# Patient Record
Sex: MALE | Race: WHITE | Employment: OTHER
[De-identification: names, ages, dates, MRNs, and addresses within clinical notes are randomized per-mention and may not be internally consistent; named-entity substitution may affect disease eponyms.]

---

## 2023-11-30 ENCOUNTER — HOSPITAL ENCOUNTER (OUTPATIENT)
Facility: HOSPITAL | Age: 28
Setting detail: RECURRING SERIES
End: 2023-11-30
Payer: OTHER GOVERNMENT

## 2023-11-30 PROCEDURE — 97530 THERAPEUTIC ACTIVITIES: CPT

## 2023-11-30 PROCEDURE — 97112 NEUROMUSCULAR REEDUCATION: CPT

## 2023-11-30 PROCEDURE — 97162 PT EVAL MOD COMPLEX 30 MIN: CPT

## 2023-11-30 NOTE — PROGRESS NOTES
[facility] at the [address], [address] [address] [address]   Phone: [phone]     Fax: [phone]       Plan of Care/ Statement of Necessity for Physical Therapy Services    Patient name: Gabriele Martinez Start of Care: 2023   Referral source: Krissy Vivas : 1995    Medical Diagnosis: Pain in right shoulder [M25.511]       Onset Date:10/1/2020    Treatment Diagnosis: M25.511  RIGHT SHOULDER PAIN                            Prior Hospitalization: see medical history Provider#: [id number]   Medications: Verified on Patient Summary List     Co-morbidities: PMHx/Surgical Hx:  []DM [] HTN (controlled) [] High cholesterol (controlled) [] Cancer [] Arthritis   [x]Other right wrist surgery due to fx and had screw placement  Prior Level of Function:  functionally independent, no AD,   Social/Recreation/Work: Work Hx: [employer], supplies, wear house, lifting up to 50-75#  Living Situation: n/a  Recreational Activities: occasional running, PT testing-not due for another year     The Plan of Care and following information is based on the information from the initial evaluation. Assessment/ key information:  Gabriele Martinez is a 34 y.o.  yo male who [facility] to [facility] with pain in unspecified shoulder. 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 Pt has constant sharp/shooting pain at the top of the right>left shoulder. Pt has increased pain with holding arm up for a long period of time, reaching above head, reaching behind back, bring arm out to the side and bringing arm up and around which limites ability with tucking in shirt, don/doff shirts, carrying objects, and lifting. Pt has relief with rest and making it pop.  Pt rates pain __7_/10 max (in the last week) _2__/10 min _5__/10 currently at rest. Pt also with altered sensation including intermittently right the last week) _2__/10 min _5__/10 currently at rest  Current: Same as IE    3. Patient will have WNL shoulder ROM all planes so pt can perform dressing task. Status at IE:   (Degrees) AROM Left Right   Flexion: seated:  Supine: 150  150 112 pain  140   ABDuction: seated:   Supine:  150 90 pain   ER @ 90 Degrees 70 70   IR @ 90 Degrees 60 65   Horizontal abduction 10 20     Current: Same as IE    4. Patient will improve FOTO score to 77 points to demonstrate improvement in functional status. Status at IE:60  Current: Same as IE    *FOTO score is an established functional score where 100 = no disability*    Frequency / Duration: Patient to be seen 2 times per week for 12 weeks     Patient/ Caregiver education and instruction: Diagnosis, prognosis, self care, activity modification, and exercisesEducated pt on pelvic and rib positioning with sitting, standing, and sleeping. Provided and reviewed ADL handout. [x]  Plan of care has been reviewed with PTA    Certification Period: n/a  Jso Maddox, PT , DPT, [facility] 11/30/2023 7:16 AM  _____________________________________________________________________  I certify that the above Therapy Services are being furnished while the patient is under my care. I agree with the treatment plan and certify that this therapy is necessary. Physician's Signature:________________________Date:_________TIME:________                                      Jono Tabor         ** Signature, Date and Time must be completed for valid certification **    Insurance: Payor:  EAST / Plan: [facility] / Product Type: *No Product type* /      Please sign and return to :   [facility] at the [address] [place] [address] [address]           Phone: [phone]      Fax:  [phone]

## 2023-11-30 NOTE — PROGRESS NOTES
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 Reports that he didn't go to MD until recently as pain progressively got worse. Reports that he has some cracking and popping. Reports pain builds up until it pops. Reports that the left shoulder is starting to pop.     Associated symptoms: intermittently right hand and top shoulder, side of the neck    Pain: Location: top of the right shoulder __7_/10 max (in the last week) _2__/10 min _5__/10 currently at rest;         [x] Sharp    [] Dull      [] Burning     []  Aching     [] Throbbing      [] Tingling     [] Other: shooting      [x]  Constant                   [] Intermittent      Increases Pain: holding arm up for a long period of time, reaching above head, reaching behind back, bring arm out to the side and bringing arm up and around  Decreases Pain: rest and making it pop  Since Onset:  Worse     Functional Limitations: tucking in shirt, don/doff shirts, carrying objects, and lifting    Previous treatment:   PT for the wrist    Co-morbidities: PMHx/Surgical Hx:  []DM [] HTN (controlled) [] High cholesterol (controlled) [] Cancer [] Arthritis   [x]Other right wrist surgery due to fx and had screw placement  Prior Level of Function:  functionally independent, no AD,   Social/Recreation/Work: Work Hx: [employer], supplies, wear house, lifting up to 50-75#  Living Situation: n/a  Recreational Activities: occasional running, PT testing-not due for another year     Diagnostic Testing: X-ray: negative    Patient Goal(s): \"find the cause or what's wrong, find ways to feel better\"    Motivation: good  Substance use: [] none []Alcohol []Tobacco []other:   Cognition: A & O x 3        20 min [x]Eval                  []Re-Eval       Therapeutic Procedures: Tx Min Billable or 1:1 Min (if diff from Tx Min) Procedure, Rationale, Specifics   8  51110 Therapeutic Activity (timed):  use of dynamic activities replicating functional movements to increase ROM, strength, coordination, balance, and proprioception in order to improve patient's ability to progress to PLOF and address remaining functional goals. (see flow sheet as applicable)     Details if applicable:  Educated pt on pelvic and rib positioning with sitting, standing, and sleeping. Provided and reviewed ADL handout. 8  [id number] Neuromuscular Re-Education (timed):  improve balance, coordination, kinesthetic sense, posture, core stability and proprioception to improve patient's ability to develop conscious control of individual muscles and awareness of position of extremities in order to progress to PLOF and address remaining functional goals. (see flow sheet as applicable)     Details if applicable:            Details if applicable:            Details if applicable:            Details if applicable:      Total Total Reminder: MC/BC bill using total billable min of TIMED therapeutic procedures (example: do not include dry needle or estim unattended, both untimed codes, in totals to left)     [x]  Patient Education billed concurrently with other procedures       Objective:    Pt enters gym in no apparent distress;   Posture: [] Poor    [] Fair    [] Good    Describe: left scapula higher than right, rounded shoulders, forward head    Outcome measure: FOTO   Score=61  Edema: n/a  Movement/gait:      Palpation: TTP: bilateral bicipital tendon, right supraspinatus muscle belly and

## 2023-12-07 ENCOUNTER — HOSPITAL ENCOUNTER (OUTPATIENT)
Facility: HOSPITAL | Age: 28
Setting detail: RECURRING SERIES
Discharge: HOME | End: 2023-12-10
Payer: OTHER GOVERNMENT

## 2023-12-07 PROCEDURE — 97112 NEUROMUSCULAR REEDUCATION: CPT

## 2023-12-07 PROCEDURE — 97140 MANUAL THERAPY 1/> REGIONS: CPT

## 2023-12-07 PROCEDURE — 97530 THERAPEUTIC ACTIVITIES: CPT

## 2023-12-07 NOTE — PROGRESS NOTES
PHYSICAL / OCCUPATIONAL THERAPY - DAILY TREATMENT NOTE (updated )    Patient Name: Angelia Singh    Date: 2023    : 1995  Insurance: Payor: [employer] EAST / Plan: [employer] EAST / Product Type: *No Product type* /      Patient  verified Yes     Visit #   Current / Total 2 24   Time   In / Out 12:41 (12:48) 1:30   Pain   In / Out 5 3.5   Subjective Functional Status/Changes: Reports that he is very tight and really feels it on the right side of the neck. Changes to: Allergies, Med Hx, Sx Hx?   no       TREATMENT AREA =  Pain in right shoulder [M25.511]    OBJECTIVE    Therapeutic Procedures: Tx Min Billable or 1:1 Min (if diff from Tx Min) Procedure, Rationale, Specifics   7 0 39463 Therapeutic Exercise (timed):  increase ROM, strength, coordination, balance, and proprioception to improve patient's ability to progress to PLOF and address remaining functional goals. (see flow sheet as applicable)    Details if applicable:       8 8 54506 Therapeutic Activity (timed):  use of dynamic activities replicating functional movements to increase ROM, strength, coordination, balance, and proprioception in order to improve patient's ability to progress to PLOF and address remaining functional goals. (see flow sheet as applicable)    Details if applicable:      24 17789 Neuromuscular Re-Education (timed):  improve balance, coordination, kinesthetic sense, posture, core stability and proprioception to improve patient's ability to develop conscious control of individual muscles and awareness of position of extremities in order to progress to PLOF and address remaining functional goals. (see flow sheet as applicable)     Details if applicable:     10 10 80701 Manual Therapy (timed):  decrease pain, increase ROM, increase tissue extensibility, decrease trigger points, and increase postural awareness to improve patient's ability to progress to PLOF and address remaining functional goals.   The manual therapy

## 2023-12-12 ENCOUNTER — HOSPITAL ENCOUNTER (OUTPATIENT)
Facility: HOSPITAL | Age: 28
Setting detail: RECURRING SERIES
Discharge: HOME | End: 2023-12-15
Payer: OTHER GOVERNMENT

## 2023-12-12 PROCEDURE — 97535 SELF CARE MNGMENT TRAINING: CPT

## 2023-12-12 PROCEDURE — 97016 VASOPNEUMATIC DEVICE THERAPY: CPT

## 2023-12-12 PROCEDURE — 97530 THERAPEUTIC ACTIVITIES: CPT

## 2023-12-12 PROCEDURE — 97110 THERAPEUTIC EXERCISES: CPT

## 2023-12-12 PROCEDURE — 97112 NEUROMUSCULAR REEDUCATION: CPT

## 2023-12-12 NOTE — PROGRESS NOTES
units; 53-67 min = 4 units; 68-82 min = 5 units   Total Total     TOTAL TREATMENT TIME:        59     [x]  Patient Education billed concurrently with other procedures   [x] Review HEP    [] Progressed/Changed HEP, detail:    [] Other detail:       Objective Information/Functional Measures/Assessment  Pretest/Post  Adductor Drop Test: right: mid/    left: positive/  Lower trunk rotation (inches): Right:16.5/17 left: 17/17.5  HGIR: right: 40/70 Left: 60/70  Horizontal abduction: right: 15/30 left: 10/15    Added child's pose with SA     Observed bilateral rib flare with deep T8 ext. Patient reports increased shoulder pain with almost all activities today despite cues for pec and UT relaxation. Patient also reports increased tingling in the last 3 digits of the right hand      Discussed with patient getting an MRI. Patient reports he is very interested in getting further imaging. Advised patient that he could request an MRI with his MD. Reports he will discuss imaging at his next appt (December 19th)     Patient will continue to benefit from skilled PT / OT services to modify and progress therapeutic interventions, analyze and address functional mobility deficits, analyze and address ROM deficits, analyze and address strength deficits, analyze and address soft tissue restrictions, analyze and cue for proper movement patterns, analyze and modify for postural abnormalities, and instruct in home and community integration to address functional deficits and attain remaining goals. Progress toward goals / Updated goals:  []  See Progress Note/Recertification     Short Term Goals: To be accomplished in 2 weeks: 1. Patient will report compliance with HEP 1x/day to aid in rehabilitation program.  Status at IE: Pt was given HEP and appeared to understand. Current:12/7/23 reviewed, updated per chart     2. Patient will increase shoulder ROM by 5-10 degrees all planes so pt can reach above head.   Status at IE:  (Degrees)

## 2023-12-22 ENCOUNTER — HOSPITAL ENCOUNTER (OUTPATIENT)
Facility: HOSPITAL | Age: 28
Setting detail: RECURRING SERIES
Discharge: HOME | End: 2023-12-25
Payer: OTHER GOVERNMENT

## 2023-12-22 PROCEDURE — 97530 THERAPEUTIC ACTIVITIES: CPT

## 2023-12-22 PROCEDURE — 97110 THERAPEUTIC EXERCISES: CPT

## 2023-12-22 PROCEDURE — 97016 VASOPNEUMATIC DEVICE THERAPY: CPT

## 2023-12-22 PROCEDURE — 97112 NEUROMUSCULAR REEDUCATION: CPT

## 2023-12-22 NOTE — PROGRESS NOTES
PHYSICAL / OCCUPATIONAL THERAPY - DAILY TREATMENT NOTE (updated )    Patient Name: July Carr    Date: 2023    : 1995  Insurance: Payor: [employer] EAST / Plan: [employer] / Product Type: *No Product type* /      Patient  verified Yes     Visit #   Current / Total 6 24   Time   In / Out 1244 136   Pain   In / Out 4 3.5   Subjective Functional Status/Changes: Reports no change    Changes to: Allergies, Med Hx, Sx Hx?   no       TREATMENT AREA =  Pain in right shoulder [M25.511]    OBJECTIVE    Modalities Rationale:     decrease edema, decrease inflammation, and decrease pain to improve patient's ability to progress to PLOF and address remaining functional goals. min [] Estim Unattended, type/location:                                                            []  w/ice    []  w/heat     min [] Estim Attended, type/location:                                                           []  w/US     []  w/ice    []  w/heat    []  TENS insruct       min []  Mechanical Traction: type/lbs                    []  pro   []  sup   []  int   []  cont    []  before manual    []  after manual     min []  Ultrasound, settings/location:        min []  Iontophoresis w/ dexamethasone, location:                                                []  take home patch       []  in clinic           min  unbilled []  Ice     []  Heat    location/position:       min []  Paraffin,  details:     10 min [x]  Vasopneumatic Device, press/temp: Right shoulder, low pressure, sitting in chair     min []  Astrid Nab / [place] [employer]:      If using vaso (only need to measure limb vaso being performed on)      pre-treatment girth : 44.0 cm      post-treatment girth : 44.0 cm      measured at (landmark location) : around acromion       min []  Other:     Skin assessment post-treatment (if applicable):    []  intact    []  redness- no adverse reaction                 []redness - adverse reaction:         Therapeutic

## 2023-12-28 ENCOUNTER — HOSPITAL ENCOUNTER (OUTPATIENT)
Facility: HOSPITAL | Age: 28
Setting detail: RECURRING SERIES
Discharge: HOME | End: 2023-12-31
Payer: OTHER GOVERNMENT

## 2023-12-28 PROCEDURE — 97140 MANUAL THERAPY 1/> REGIONS: CPT

## 2023-12-28 PROCEDURE — 97112 NEUROMUSCULAR REEDUCATION: CPT

## 2023-12-28 PROCEDURE — 97016 VASOPNEUMATIC DEVICE THERAPY: CPT

## 2023-12-28 PROCEDURE — 97530 THERAPEUTIC ACTIVITIES: CPT

## 2023-12-28 NOTE — PROGRESS NOTES
PHYSICAL / OCCUPATIONAL THERAPY - DAILY TREATMENT NOTE (updated )    Patient Name: Geovani Cordero    Date: 2023    : 1995  Insurance: Payor: [employer] / Plan: [employer] / Product Type: *No Product type* /      Patient  verified Yes     Visit #   Current / Total 7 24   Time   In / Out 12:01 12:56   Pain   In / Out 4.5/10 4   Subjective Functional Status/Changes: Reports that the worst pain in the last 48 hrs 6.5/10. Reports that he has been doing the HEP, did them this morning, just a couple not all of them. Reports that he can't hold arm over head for a long period of time. Reports that he still has intermittent tingling in the right hand.      Changes to:  Allergies, Med Hx, Sx Hx?   no       TREATMENT AREA =  Pain in right shoulder [M25.511]    OBJECTIVE    Modalities Rationale:     decrease edema, decrease inflammation, and decrease pain to improve patient's ability to progress to PLOF and address remaining functional goals.     min [] Estim Unattended, type/location:                                      []  w/ice    []  w/heat    min [] Estim Attended, type/location:                                     []  w/US     []  w/ice    []  w/heat    []  TENS insruct      min []  Mechanical Traction: type/lbs                   []  pro   []  sup   []  int   []  cont    []  before manual    []  after manual    min []  Ultrasound, settings/location:      min []  Iontophoresis w/ dexamethasone, location:                                               []  take home patch       []  in clinic        min  unbilled []  Ice     []  Heat    location/position:     min []  Paraffin,  details:    10 min [x]  Vasopneumatic Device, press/temp: Right shoulder, low pressure, sitting in chair    min []  [employer] / Fluido:    If using vaso (only need to measure limb vaso being performed on)      pre-treatment girth : 43 cm      post-treatment girth : 43 cm      measured at (landmark location) : around acromion     min

## 2023-12-28 NOTE — PROGRESS NOTES
[facility] at the [address], [place] [address]  Phone: [phone]      Fax:  [phone]    Progress Note    Patient name: Geovani Cordero Start of Care: 2023   Referral source: VeronicaAntionette zamudio : 1995               Medical Diagnosis: Pain in right shoulder [M25.511]        Onset Date:10/1/2020               Treatment Diagnosis: M25.511  RIGHT SHOULDER PAIN                            Prior Hospitalization: see medical history Provider#: [id number]   Medications: Verified on Patient Summary List      Co-morbidities: PMHx/Surgical Hx:  []DM [] HTN (controlled) [] High cholesterol (controlled) [] Cancer [] Arthritis   [x]Other right wrist surgery due to fx and had screw placement  Prior Level of Function:  functionally independent, no AD,   Social/Recreation/Work: Work Hx: [employer], supplies, wear house, lifting up to 50-75#  Living Situation: n/a  Recreational Activities: occasional running, PT testing-not due for another year                   The Plan of Care and following information is based on the information from the initial evaluation.     Visits from Start of Care: 7    Missed Visits: 0    Updated Goals/Measure of Progress: To be achieved in 17 more visits:    Short Term Goals:   To be accomplished in 2 weeks:  1.Patient will report compliance with HEP 1x/day to aid in rehabilitation program.  Status at IE: Pt was given HEP and appeared to understand.  Current:23 reports compliance, reports did some before coming to therapy     2.Patient will increase shoulder ROM by 5-10 degrees all planes so pt can reach above head.  Status at IE:  (Degrees) AROM Left Right   Flexion: seated:  Supine: 150  150 112 pain  140   ABDuction: seated:   Supine:  150 90 pain   ER @ 90 Degrees 70 70   IR @ 90 Degrees 60 65   Horizontal abduction 10 20      Current: 23 -progressing  (Degrees) AROM Left Right   Flexion: seated:  Supine: 150  150

## 2024-01-05 ENCOUNTER — HOSPITAL ENCOUNTER (OUTPATIENT)
Facility: HOSPITAL | Age: 29
Setting detail: RECURRING SERIES
Discharge: HOME | End: 2024-01-08
Payer: OTHER GOVERNMENT

## 2024-01-05 PROCEDURE — 97110 THERAPEUTIC EXERCISES: CPT

## 2024-01-05 PROCEDURE — 97016 VASOPNEUMATIC DEVICE THERAPY: CPT

## 2024-01-05 PROCEDURE — 97112 NEUROMUSCULAR REEDUCATION: CPT

## 2024-01-05 PROCEDURE — 97140 MANUAL THERAPY 1/> REGIONS: CPT

## 2024-01-05 NOTE — PROGRESS NOTES
PHYSICAL / OCCUPATIONAL THERAPY - DAILY TREATMENT NOTE     Patient Name: Geovani Cordero    Date: 2024    : 1995  Insurance: Payor: [employer] / Plan: [employer] / Product Type: *No Product type* /      Patient  verified Yes     Visit #   Current / Total 8 24   Time   In / Out 206 pm  256 pm    Pain   In / Out 5 3.5   Subjective Functional Status/Changes: Pt reports he has been having some right neck pain which he believes is from the right shoulder.    Changes to:  Allergies, Med Hx, Sx Hx?   no       TREATMENT AREA =  Pain in right shoulder [M25.511]    OBJECTIVE    Modalities Rationale:     decrease inflammation and decrease pain to improve patient's ability to progress to PLOF and address remaining functional goals.     min [] Estim Unattended, type/location:                                      []  w/ice    []  w/heat    min [] Estim Attended, type/location:                                     []  w/US     []  w/ice    []  w/heat    []  TENS insruct      min []  Mechanical Traction: type/lbs                   []  pro   []  sup   []  int   []  cont    []  before manual    []  after manual    min []  Ultrasound, settings/location:      min []  Iontophoresis w/ dexamethasone, location:                                               []  take home patch       []  in clinic        min  unbilled []  Ice     []  Heat    location/position:     min []  Paraffin,  details:    10 min [x]  Vasopneumatic Device, press/temp: 34 deg, low     min []  Whirlpool / Fluido:    If using vaso (only need to measure limb vaso being performed on)      pre-treatment girth : 46.2 cm (with light long sleeve on)       post-treatment girth : 46 cm       measured at (landmark location) :  mid axilla     min []  Other:    Skin assessment post-treatment (if applicable):    []  intact    []  redness- no adverse reaction                 []redness - adverse reaction:         Therapeutic Procedures:  Tx Min Billable or 1:1 Min (if diff

## 2024-01-12 ENCOUNTER — TELEPHONE (OUTPATIENT)
Facility: HOSPITAL | Age: 29
End: 2024-01-12

## 2024-01-12 NOTE — TELEPHONE ENCOUNTER
Spoke with patient d/t no show today. Reports he just got out of work and is unable to access his phone at work to let the clinic know he will not be present. Confirmed nxt appt

## 2024-01-16 ENCOUNTER — HOSPITAL ENCOUNTER (OUTPATIENT)
Facility: HOSPITAL | Age: 29
Setting detail: RECURRING SERIES
Discharge: HOME | End: 2024-01-19
Payer: OTHER GOVERNMENT

## 2024-01-16 PROCEDURE — 97112 NEUROMUSCULAR REEDUCATION: CPT

## 2024-01-16 PROCEDURE — 97110 THERAPEUTIC EXERCISES: CPT

## 2024-01-16 PROCEDURE — 97530 THERAPEUTIC ACTIVITIES: CPT

## 2024-01-16 PROCEDURE — 97140 MANUAL THERAPY 1/> REGIONS: CPT

## 2024-01-16 NOTE — PROGRESS NOTES
PHYSICAL / OCCUPATIONAL THERAPY - DAILY TREATMENT NOTE     Patient Name: Geovani Cordero    Date: 2024    : 1995  Insurance: Payor: [employer] / Plan: [employer] / Product Type: *No Product type* /      Patient  verified Yes     Visit #   Current / Total 9 24   Time   In / Out 1133 1224   Pain   In / Out 4/10  4/10   Subjective Functional Status/Changes: Patient arrived 13 min late, patient thought his appt was at 1140 however was confirmed last Friday for 1120 today when called patient d/t no show Reports pain got up to a 5/10 over the weekend. Reports he received a referral for MRI, awaiting scheduling. Reports he wants paper work upon discharging from therapy for proof of therapy.    Changes to:  Allergies, Med Hx, Sx Hx?   no       TREATMENT AREA =  Pain in right shoulder [M25.511]    OBJECTIVE      Modalities Rationale:   Patient Declined   Therapeutic Procedures:  Tx Min Billable or 1:1 Min (if diff from Tx Min) Procedure, Rationale, Specifics   8  15377 Therapeutic Exercise (timed):  increase ROM, strength, coordination, balance, and proprioception to improve patient's ability to progress to PLOF and address remaining functional goals. (see flow sheet as applicable)    Details if applicable:       12  24240 Neuromuscular Re-Education (timed):  improve balance, coordination, kinesthetic sense, posture, core stability and proprioception to improve patient's ability to develop conscious control of individual muscles and awareness of position of extremities in order to progress to PLOF and address remaining functional goals. (see flow sheet as applicable)    Details if applicable:     23  21482 Therapeutic Activity (timed):  use of dynamic activities replicating functional movements to increase ROM, strength, coordination, balance, and proprioception in order to improve patient's ability to progress to PLOF and address remaining functional goals.  (see flow sheet as applicable)     Details if

## 2024-01-23 ENCOUNTER — HOSPITAL ENCOUNTER (OUTPATIENT)
Facility: HOSPITAL | Age: 29
Setting detail: RECURRING SERIES
Discharge: HOME | End: 2024-01-26
Payer: OTHER GOVERNMENT

## 2024-01-23 PROCEDURE — 97110 THERAPEUTIC EXERCISES: CPT

## 2024-01-23 PROCEDURE — 97112 NEUROMUSCULAR REEDUCATION: CPT

## 2024-01-23 PROCEDURE — 97530 THERAPEUTIC ACTIVITIES: CPT

## 2024-01-23 PROCEDURE — 97016 VASOPNEUMATIC DEVICE THERAPY: CPT

## 2024-01-23 NOTE — PROGRESS NOTES
PHYSICAL / OCCUPATIONAL THERAPY - DAILY TREATMENT NOTE     Patient Name: Geovani Cordero    Date: 2024    : 1995  Insurance: Payor: [employer] / Plan: [employer] / Product Type: *No Product type* /      Patient  verified YES   Visit #   Current / Total 10 24   Time   In / Out 12:03 PM 1:00 PM   Pain   In / Out 4-5 4   Subjective Functional Status/Changes: Patient reports he is doing well today but is having 4-5/10 shoulder pain at arrival. Pt states continued pain, clicking, and difficulty with overhead reaching and overhead tasks.    Changes to:  Allergies, Med Hx, Sx Hx?   no       TREATMENT AREA =  Pain in right shoulder [M25.511]    OBJECTIVE    Modalities Rationale:     decrease inflammation and decrease pain to improve patient's ability to progress to PLOF and address remaining functional goals.     min [] Estim Unattended, type/location:                                      []  w/ice    []  w/heat    min [] Estim Attended, type/location:                                     []  w/US     []  w/ice    []  w/heat    []  TENS insruct      min []  Mechanical Traction: type/lbs                   []  pro   []  sup   []  int   []  cont    []  before manual    []  after manual    min []  Ultrasound, settings/location:      min []  Iontophoresis w/ dexamethasone, location:                                               []  take home patch       []  in clinic        min  unbilled []  Ice     []  Heat    location/position:     min []  Paraffin,  details:    10 min [x]  Vasopneumatic Device, press/temp: Low/low    min []  Whirlpool / Fluido:    If using vaso (only need to measure limb vaso being performed on)      pre-treatment girth : 44 cm      post-treatment girth : 44 cm      measured at (landmark location) : around acromion     min []  Other:    Skin assessment post-treatment (if applicable):    []  intact    []  redness- no adverse reaction                 []redness - adverse reaction:

## 2024-01-30 ENCOUNTER — TELEPHONE (OUTPATIENT)
Facility: HOSPITAL | Age: 29
End: 2024-01-30

## 2024-01-30 NOTE — TELEPHONE ENCOUNTER
Second phone call to pt today from provider. First phone call-spoke with patient, pt stating he couldn't make his 1:20 PM appt. Moved his appt down to 2:40 PM. Patient no show to 2:40 appt on same day--therapist leaves voice message for pt reminding him of next visit and D/C policy.

## 2024-02-01 ENCOUNTER — TELEPHONE (OUTPATIENT)
Facility: HOSPITAL | Age: 29
End: 2024-02-01

## 2024-06-06 NOTE — TELEPHONE ENCOUNTER
\"Have you been to the ER, urgent care clinic since your last visit?  Hospitalized since your last visit?\"    YES - When: approximately 3 months ago.  Where and Why: 02/24/2024 [facility] for subconjunctival hemorrhage.    “Have you seen or consulted any other health care providers outside of [facility] since your last visit?”    NO            Click Here for Release of Records Request   Pt no show. Called and LM to inform patient of open appointment slots same day of missed appointment. Notified patient this was last scheduled appointment and to return call to confirm if he can make it in at a later time and/or to schedule future visits   because of your eyesight?: (!) Yes  Have you had an eye exam within the past year?: (!) No  No results found.    Interventions:   Patient encouraged to make appointment with their eye specialist                Objective   Vitals:    06/06/24 1404   BP: (!) 144/68   Site: Right Upper Arm   Position: Sitting   Cuff Size: Large Adult   Pulse: 61   Resp: 16   Temp: 97.8 °F (36.6 °C)   TempSrc: Temporal   SpO2: 100%   Weight: 70.3 kg (155 lb)   Height: 1.753 m (5' 9\")      Body mass index is 22.89 kg/m².            No Known Allergies  Prior to Visit Medications    Medication Sig Taking? Authorizing Provider   amLODIPine (NORVASC) 5 MG tablet Take 1 tablet by mouth daily Yes Provider, MD Godwin   rosuvastatin (CRESTOR) 10 MG tablet Take 1 tablet by mouth nightly Yes David Galeana MD   metoprolol tartrate (LOPRESSOR) 25 MG tablet Take 0.5 tablets by mouth 2 times daily Yes David Galeana MD   empagliflozin (JARDIANCE) 10 MG tablet Take 1 tablet by mouth daily Yes David Galeana MD   furosemide (LASIX) 40 MG tablet Take 1 tablet by mouth daily Yes David Galeana MD   pantoprazole (PROTONIX) 40 MG tablet take 1 tablet by mouth once daily Yes David Galeana MD   ferrous sulfate (IRON 325) 325 (65 Fe) MG tablet Take 1 tablet by mouth every morning (before breakfast) Yes David Galeana MD   RA VITAMIN B-12 TR 1000 MCG TBCR take 1 tablet by mouth daily Yes David Galeana MD   aspirin 81 MG chewable tablet Take 1 tablet by mouth daily Yes Automatic Reconciliation, Ar       CareTeam (Including outside providers/suppliers regularly involved in providing care):   Patient Care Team:  David Galeana MD as PCP - General  David Galeana MD as PCP - Empaneled Provider  Jose Fraser MD as Consulting Physician (Cardiology)  João Basilio MD (Urology)  Negro Hall MD (Orthopedic Surgery)     Reviewed and updated this visit: